# Patient Record
Sex: MALE | Race: WHITE | NOT HISPANIC OR LATINO | URBAN - METROPOLITAN AREA
[De-identification: names, ages, dates, MRNs, and addresses within clinical notes are randomized per-mention and may not be internally consistent; named-entity substitution may affect disease eponyms.]

---

## 2024-03-18 ENCOUNTER — OFFICE VISIT (OUTPATIENT)
Dept: CARDIOLOGY | Facility: HOSPITAL | Age: 39
End: 2024-03-18

## 2024-03-18 VITALS
WEIGHT: 293.87 LBS | SYSTOLIC BLOOD PRESSURE: 133 MMHG | BODY MASS INDEX: 46.03 KG/M2 | DIASTOLIC BLOOD PRESSURE: 86 MMHG | OXYGEN SATURATION: 94 % | HEART RATE: 72 BPM

## 2024-03-18 DIAGNOSIS — I50.9 CONGESTIVE HEART FAILURE, UNSPECIFIED HF CHRONICITY, UNSPECIFIED HEART FAILURE TYPE (MULTI): ICD-10-CM

## 2024-03-18 DIAGNOSIS — I10 HYPERTENSION, UNSPECIFIED TYPE: Primary | ICD-10-CM

## 2024-03-18 PROCEDURE — 93005 ELECTROCARDIOGRAM TRACING: CPT | Performed by: NURSE PRACTITIONER

## 2024-03-18 PROCEDURE — 99213 OFFICE O/P EST LOW 20 MIN: CPT | Performed by: NURSE PRACTITIONER

## 2024-03-18 PROCEDURE — 3079F DIAST BP 80-89 MM HG: CPT | Performed by: NURSE PRACTITIONER

## 2024-03-18 PROCEDURE — 93010 ELECTROCARDIOGRAM REPORT: CPT | Performed by: INTERNAL MEDICINE

## 2024-03-18 PROCEDURE — 3075F SYST BP GE 130 - 139MM HG: CPT | Performed by: NURSE PRACTITIONER

## 2024-03-18 PROCEDURE — 1036F TOBACCO NON-USER: CPT | Performed by: NURSE PRACTITIONER

## 2024-03-18 RX ORDER — METOPROLOL SUCCINATE 100 MG/1
100 TABLET, EXTENDED RELEASE ORAL DAILY
COMMUNITY
End: 2024-03-18 | Stop reason: SDUPTHER

## 2024-03-18 RX ORDER — METOPROLOL SUCCINATE 100 MG/1
100 TABLET, EXTENDED RELEASE ORAL DAILY
Qty: 90 TABLET | Refills: 3 | Status: SHIPPED | OUTPATIENT
Start: 2024-03-18 | End: 2025-03-18

## 2024-03-18 RX ORDER — FUROSEMIDE 40 MG/1
40 TABLET ORAL DAILY
Qty: 90 TABLET | Refills: 3 | Status: SHIPPED | OUTPATIENT
Start: 2024-03-18 | End: 2025-03-18

## 2024-03-18 RX ORDER — FUROSEMIDE 40 MG/1
40 TABLET ORAL DAILY
COMMUNITY
End: 2024-03-18 | Stop reason: SDUPTHER

## 2024-03-18 RX ORDER — LISINOPRIL 40 MG/1
40 TABLET ORAL DAILY
COMMUNITY
End: 2024-03-18 | Stop reason: SDUPTHER

## 2024-03-18 RX ORDER — LISINOPRIL 40 MG/1
40 TABLET ORAL DAILY
Qty: 90 TABLET | Refills: 3 | Status: SHIPPED | OUTPATIENT
Start: 2024-03-18 | End: 2025-03-18

## 2024-03-18 ASSESSMENT — ENCOUNTER SYMPTOMS
CONSTITUTIONAL NEGATIVE: 1
HEMATOLOGIC/LYMPHATIC NEGATIVE: 1
GASTROINTESTINAL NEGATIVE: 1
EYES NEGATIVE: 1
RESPIRATORY NEGATIVE: 1
PSYCHIATRIC NEGATIVE: 1
CARDIOVASCULAR NEGATIVE: 1
NEUROLOGICAL NEGATIVE: 1
MYALGIAS: 1
ENDOCRINE NEGATIVE: 1

## 2024-03-18 NOTE — PROGRESS NOTES
Referred by Dr. Luke for Follow-up and Hypertension     History Of Present Illness:    Chaz Foss is a very pleasant 38 year old gentleman with a history of hypertension and heart failure (LVEF 40%), he is here for a follow up visit. The patient is seen in collaboration with Dr. Garcia. Mr. Foss has been watching his diet and has increased his activity. Denies chest pain, shortness of breath or heart palpitations. States overall has been feeling good.        Review of Systems   Constitutional: Negative.   HENT: Negative.     Eyes: Negative.    Cardiovascular: Negative.    Respiratory: Negative.     Endocrine: Negative.    Hematologic/Lymphatic: Negative.    Skin: Negative.    Musculoskeletal:  Positive for myalgias.   Gastrointestinal: Negative.    Neurological: Negative.    Psychiatric/Behavioral: Negative.            Past Medical History:  He has no past medical history on file.    Past Surgical History:  He has no past surgical history on file.      Social History:  He reports that he has never smoked. He has never used smokeless tobacco. He reports current alcohol use. He reports that he does not currently use drugs.    Family History:  No family history on file.     Allergies:  Patient has no known allergies.    Outpatient Medications:  Current Outpatient Medications   Medication Instructions    furosemide (LASIX) 40 mg, oral, Daily    lisinopril 40 mg, oral, Daily, as directed    metoprolol succinate XL (TOPROL-XL) 100 mg, oral, Daily        Last Recorded Vitals:  Vitals:    03/18/24 0946   BP: 133/86   Pulse: 72   SpO2: 94%   Weight: 133 kg (293 lb 14 oz)       Physical Exam:  Physical Exam  Vitals reviewed.   HENT:      Head: Normocephalic.      Nose: Nose normal.   Eyes:      Pupils: Pupils are equal, round, and reactive to light.   Cardiovascular:      Rate and Rhythm: Normal rate and regular rhythm.   Pulmonary:      Effort: Pulmonary effort is normal.      Breath sounds: Normal breath sounds.    Abdominal:      General: Abdomen is flat.      Palpations: Abdomen is soft.   Musculoskeletal:         General: Normal range of motion.      Cervical back: Normal range of motion.   Skin:     General: Skin is warm and dry.   Neurological:      General: No focal deficit present.      Mental Status: He is alert and oriented to person, place, and time.   Psychiatric:         Mood and Affect: Mood normal.          Last Labs:  CBC -  Lab Results   Component Value Date    WBC 7.8 10/17/2019    HGB 14.5 10/17/2019    HCT 49.2 10/17/2019    MCV 88 10/17/2019     10/17/2019       CMP -  Lab Results   Component Value Date    CALCIUM 9.8 12/12/2019    PHOS 4.2 10/17/2019    PROT 6.2 (L) 10/16/2019    ALBUMIN 3.8 10/17/2019    AST 20 10/16/2019    ALT 24 10/16/2019    ALKPHOS 69 10/16/2019    BILITOT 1.1 10/16/2019       LIPID PANEL -   Lab Results   Component Value Date    CHOL 107 10/15/2019    TRIG 60 10/15/2019    HDL 28.0 (A) 10/15/2019    CHHDL 3.8 10/15/2019    LDLF 67 10/15/2019    VLDL 12 10/15/2019       RENAL FUNCTION PANEL -   Lab Results   Component Value Date    GLUCOSE 101 (H) 12/12/2019     12/12/2019    K 4.0 12/12/2019     12/12/2019    CO2 27 12/12/2019    ANIONGAP 12 12/12/2019    BUN 25 (H) 12/12/2019    CREATININE 1.23 12/12/2019    CALCIUM 9.8 12/12/2019    PHOS 4.2 10/17/2019    ALBUMIN 3.8 10/17/2019        Lab Results   Component Value Date    BNP 18 12/03/2019       Last Cardiology Tests:  ECG:  EKG independently reviewed from today showed sinus rhythm incomplete RBBB heart rate 72 bpm   Echo:  Echocardiogram 10/16/2019   1. The left ventricular systolic function is mildly decreased with a 40%  estimated ejection fraction.  2. Spectral Doppler shows a pseudonormal pattern of left ventricular diastolic  filling.  3. RVSP within normal limits.  4. There is global hypokinesis of the left ventricle with minor regional  variations.  Ejection Fractions:  LVEF 40%  Cath:    Stress  Test:    Cardiac Imaging:      Assessment/Plan   Mr. Foss is a very pleasant 38 year old gentleman with a history of hypertension, and heart failure (LVEF 40%), he continues to do well from a cardiac standpoint. He continues to stay active without limitations. He will have a CT calcium score given his risk factors. Heart rate and blood pressure are well controlled today. He is working on health insurance, once he has it, he will call and be set up for an echocardiogram.      Plan   -call with any questions  -continue Spironolactone 25 mg daily   -take the Lasix 40 mg as needed   -continue Lisinopril and Metoprolol   -follow up in one year   -CT calcium score   -will call to review the results         MENG Soler-CNP

## 2024-04-13 LAB
ATRIAL RATE: 72 BPM
P AXIS: 26 DEGREES
P OFFSET: 172 MS
P ONSET: 119 MS
PR INTERVAL: 198 MS
Q ONSET: 218 MS
QRS COUNT: 12 BEATS
QRS DURATION: 116 MS
QT INTERVAL: 376 MS
QTC CALCULATION(BAZETT): 411 MS
QTC FREDERICIA: 399 MS
R AXIS: 11 DEGREES
T AXIS: 51 DEGREES
T OFFSET: 406 MS
VENTRICULAR RATE: 72 BPM

## 2024-06-10 ENCOUNTER — APPOINTMENT (OUTPATIENT)
Dept: RADIOLOGY | Facility: HOSPITAL | Age: 39
End: 2024-06-10

## 2024-09-23 ENCOUNTER — HOSPITAL ENCOUNTER (OUTPATIENT)
Dept: RADIOLOGY | Facility: HOSPITAL | Age: 39
Discharge: HOME | End: 2024-09-23

## 2024-09-23 DIAGNOSIS — I10 HYPERTENSION, UNSPECIFIED TYPE: ICD-10-CM

## 2024-09-23 PROCEDURE — 75571 CT HRT W/O DYE W/CA TEST: CPT

## 2024-09-24 ENCOUNTER — TELEPHONE (OUTPATIENT)
Dept: CARDIOLOGY | Facility: HOSPITAL | Age: 39
End: 2024-09-24

## 2024-09-24 NOTE — TELEPHONE ENCOUNTER
----- Message from Mehnaz Luke sent at 9/23/2024  2:53 PM EDT -----  Please call and let her know her CT calcium score is 0   Thanks  ----- Message -----  From: Interface, Radiology Results In  Sent: 9/23/2024   1:04 PM EDT  To: Mehnaz Luke, MENG-CNP

## 2025-03-17 ENCOUNTER — APPOINTMENT (OUTPATIENT)
Dept: CARDIOLOGY | Facility: HOSPITAL | Age: 40
End: 2025-03-17

## 2025-03-26 ENCOUNTER — OFFICE VISIT (OUTPATIENT)
Dept: CARDIOLOGY | Facility: HOSPITAL | Age: 40
End: 2025-03-26

## 2025-03-26 VITALS
DIASTOLIC BLOOD PRESSURE: 79 MMHG | BODY MASS INDEX: 47.03 KG/M2 | OXYGEN SATURATION: 95 % | SYSTOLIC BLOOD PRESSURE: 144 MMHG | WEIGHT: 300.27 LBS | HEART RATE: 68 BPM

## 2025-03-26 DIAGNOSIS — I10 HYPERTENSION, UNSPECIFIED TYPE: Primary | ICD-10-CM

## 2025-03-26 DIAGNOSIS — I50.9 CONGESTIVE HEART FAILURE, UNSPECIFIED HF CHRONICITY, UNSPECIFIED HEART FAILURE TYPE: ICD-10-CM

## 2025-03-26 LAB
ATRIAL RATE: 67 BPM
P AXIS: 2 DEGREES
P OFFSET: 175 MS
P ONSET: 127 MS
PR INTERVAL: 192 MS
Q ONSET: 223 MS
QRS COUNT: 11 BEATS
QRS DURATION: 114 MS
QT INTERVAL: 386 MS
QTC CALCULATION(BAZETT): 407 MS
QTC FREDERICIA: 400 MS
R AXIS: -4 DEGREES
T AXIS: 57 DEGREES
T OFFSET: 416 MS
VENTRICULAR RATE: 67 BPM

## 2025-03-26 PROCEDURE — 99213 OFFICE O/P EST LOW 20 MIN: CPT | Performed by: NURSE PRACTITIONER

## 2025-03-26 PROCEDURE — 3078F DIAST BP <80 MM HG: CPT | Performed by: NURSE PRACTITIONER

## 2025-03-26 PROCEDURE — 1036F TOBACCO NON-USER: CPT | Performed by: NURSE PRACTITIONER

## 2025-03-26 PROCEDURE — 3077F SYST BP >= 140 MM HG: CPT | Performed by: NURSE PRACTITIONER

## 2025-03-26 PROCEDURE — 93005 ELECTROCARDIOGRAM TRACING: CPT | Performed by: NURSE PRACTITIONER

## 2025-03-26 RX ORDER — FUROSEMIDE 40 MG/1
40 TABLET ORAL DAILY
Qty: 90 TABLET | Refills: 3 | Status: SHIPPED | OUTPATIENT
Start: 2025-03-26 | End: 2026-03-26

## 2025-03-26 RX ORDER — METOPROLOL SUCCINATE 100 MG/1
100 TABLET, EXTENDED RELEASE ORAL DAILY
Qty: 90 TABLET | Refills: 3 | Status: SHIPPED | OUTPATIENT
Start: 2025-03-26 | End: 2026-03-26

## 2025-03-26 RX ORDER — LISINOPRIL 40 MG/1
40 TABLET ORAL DAILY
Qty: 90 TABLET | Refills: 3 | Status: SHIPPED | OUTPATIENT
Start: 2025-03-26 | End: 2026-03-26

## 2025-03-26 ASSESSMENT — ENCOUNTER SYMPTOMS
ENDOCRINE NEGATIVE: 1
PSYCHIATRIC NEGATIVE: 1
CARDIOVASCULAR NEGATIVE: 1
CONSTITUTIONAL NEGATIVE: 1
MYALGIAS: 1
GASTROINTESTINAL NEGATIVE: 1
NEUROLOGICAL NEGATIVE: 1
EYES NEGATIVE: 1
HEMATOLOGIC/LYMPHATIC NEGATIVE: 1
RESPIRATORY NEGATIVE: 1

## 2025-03-26 NOTE — PROGRESS NOTES
Referred by Dr. Luke for No chief complaint on file.     History Of Present Illness:    Chaz Foss is a very pleasant 39 year old gentleman with a history of hypertension and heart failure (LVEF 40%), he is here for a follow up visit. The patient is seen in collaboration with Dr. Garcia. Mr. Foss denies chest pain, shortness of breath or heart palpitations. Denies dizziness.       Review of Systems   Constitutional: Negative.   HENT: Negative.     Eyes: Negative.    Cardiovascular: Negative.    Respiratory: Negative.     Endocrine: Negative.    Hematologic/Lymphatic: Negative.    Skin: Negative.    Musculoskeletal:  Positive for myalgias.   Gastrointestinal: Negative.    Neurological: Negative.    Psychiatric/Behavioral: Negative.            Past Medical History:  He has no past medical history on file.    Past Surgical History:  He has no past surgical history on file.      Social History:  He reports that he has never smoked. He has never used smokeless tobacco. He reports current alcohol use. He reports that he does not currently use drugs.    Family History:  No family history on file.     Allergies:  Patient has no known allergies.    Outpatient Medications:  Current Outpatient Medications   Medication Instructions    furosemide (LASIX) 40 mg, oral, Daily    lisinopril 40 mg, oral, Daily, as directed    metoprolol succinate XL (TOPROL-XL) 100 mg, oral, Daily        Last Recorded Vitals:  Vitals:    03/26/25 0821   BP: 144/79   Pulse: 68   SpO2: 95%   Weight: 136 kg (300 lb 4.3 oz)         Physical Exam:  Physical Exam  Vitals reviewed.   HENT:      Head: Normocephalic.      Nose: Nose normal.   Eyes:      Pupils: Pupils are equal, round, and reactive to light.   Cardiovascular:      Rate and Rhythm: Normal rate and regular rhythm.   Pulmonary:      Effort: Pulmonary effort is normal.      Breath sounds: Normal breath sounds.   Abdominal:      General: Abdomen is flat.      Palpations: Abdomen is soft.    Musculoskeletal:         General: Normal range of motion.      Cervical back: Normal range of motion.   Skin:     General: Skin is warm and dry.   Neurological:      General: No focal deficit present.      Mental Status: He is alert and oriented to person, place, and time.   Psychiatric:         Mood and Affect: Mood normal.          Last Labs:  CBC -  Lab Results   Component Value Date    WBC 7.8 10/17/2019    HGB 14.5 10/17/2019    HCT 49.2 10/17/2019    MCV 88 10/17/2019     10/17/2019       CMP -  Lab Results   Component Value Date    CALCIUM 9.8 12/12/2019    PHOS 4.2 10/17/2019    PROT 6.2 (L) 10/16/2019    ALBUMIN 3.8 10/17/2019    AST 20 10/16/2019    ALT 24 10/16/2019    ALKPHOS 69 10/16/2019    BILITOT 1.1 10/16/2019       LIPID PANEL -   Lab Results   Component Value Date    CHOL 107 10/15/2019    TRIG 60 10/15/2019    HDL 28.0 (A) 10/15/2019    CHHDL 3.8 10/15/2019    LDLF 67 10/15/2019    VLDL 12 10/15/2019       RENAL FUNCTION PANEL -   Lab Results   Component Value Date    GLUCOSE 101 (H) 12/12/2019     12/12/2019    K 4.0 12/12/2019     12/12/2019    CO2 27 12/12/2019    ANIONGAP 12 12/12/2019    BUN 25 (H) 12/12/2019    CREATININE 1.23 12/12/2019    CALCIUM 9.8 12/12/2019    PHOS 4.2 10/17/2019    ALBUMIN 3.8 10/17/2019        Lab Results   Component Value Date    BNP 18 12/03/2019       Last Cardiology Tests:  ECG:  EKG independently reviewed from today showed sinus rhythm incomplete RBBB heart rate 67 bpm   Echo:  Echocardiogram 10/16/2019   1. The left ventricular systolic function is mildly decreased with a 40%  estimated ejection fraction.  2. Spectral Doppler shows a pseudonormal pattern of left ventricular diastolic  filling.  3. RVSP within normal limits.  4. There is global hypokinesis of the left ventricle with minor regional  variations.  Ejection Fractions:  LVEF 40%  Cath:    Stress Test:    Cardiac Imaging:  CT calcium score 9/23/2024  LM             0  LAD            0  LCx            0  RCA           0      Total         0    Assessment/Plan   CMr. Pipo is a very pleasant 39 year old gentleman with a history of hypertension, and heart failure (LVEF 40%), he continues to do well from a cardiac standpoint. He continues to stay active without limitations.  Heart rate and blood pressure are well controlled today.      Plan   -call with any questions  -take the Lasix 40 mg as needed   -continue Lisinopril 40 mg daily and Metoprolol  mg daily  -follow up in one year         Mehnaz Roland, APRN-CNP
